# Patient Record
Sex: FEMALE | Race: BLACK OR AFRICAN AMERICAN
[De-identification: names, ages, dates, MRNs, and addresses within clinical notes are randomized per-mention and may not be internally consistent; named-entity substitution may affect disease eponyms.]

---

## 2018-04-25 ENCOUNTER — HOSPITAL ENCOUNTER (EMERGENCY)
Dept: HOSPITAL 17 - PHED | Age: 33
Discharge: HOME | End: 2018-04-25
Payer: MEDICAID

## 2018-04-25 VITALS — WEIGHT: 189.6 LBS | HEIGHT: 61 IN | BODY MASS INDEX: 35.8 KG/M2

## 2018-04-25 VITALS
SYSTOLIC BLOOD PRESSURE: 163 MMHG | OXYGEN SATURATION: 96 % | RESPIRATION RATE: 16 BRPM | TEMPERATURE: 99 F | DIASTOLIC BLOOD PRESSURE: 109 MMHG | HEART RATE: 70 BPM

## 2018-04-25 DIAGNOSIS — B00.1: Primary | ICD-10-CM

## 2018-04-25 PROCEDURE — 99283 EMERGENCY DEPT VISIT LOW MDM: CPT

## 2018-04-25 NOTE — PD
HPI


Chief Complaint:  ENT Complaint


Time Seen by Provider:  09:05


Travel History


International Travel<30 days:  No


Contact w/Intl Traveler<30days:  No


Traveled to known affect area:  No





History of Present Illness


HPI


This is a 32-year-old female here with painful blisters to her upper lip 3 

days.  Symptom severity is moderate.  She describes the pain as "burning" and 

constant.  No fever chills.  Similar symptoms in the past with cold sores.  

Denies any eye pain or visual changes.  No aggravating or alleviating factors.  

Has not attempted any OTC medications.





PFSH


Past Medical History


Medical History:  Denies Significant Hx


Blood Disorders:  No


Cancer:  No


Cardiovascular Problems:  No


Diabetes:  No


Diminished Hearing:  No


Endocrine:  No


Genitourinary:  No


Hepatitis:  No


Hiatal Hernia:  No


Immune Disorder:  No


Kidney Stones:  Yes


Medical other:  Yes


Musculoskeletal:  No


Neurologic:  No


Psychiatric:  No


Reproductive:  No


Respiratory:  No


Thyroid Disease:  No


Influenza Vaccination:  No


Pregnant?:  Not Pregnant


LMP:  2018


:  4


Para:  2


Miscarriage:  1


:  1


Tubal Ligation:  Yes





Past Surgical History


Abdominal Surgery:  No


AICD:  No


Cardiac Surgery:  No


Ear Surgery:  No


Endocrine Surgery:  No


Eye Surgery:  No


Genitourinary Surgery:  No


Gynecologic Surgery:  Yes (tubal LIGATION)


Joint Replacement:  No


Oral Surgery:  No


Pacemaker:  No


Thoracic Surgery:  No


Other Surgery:  Yes (TUBAL LIGATION)





Social History


Alcohol Use:  No


Tobacco Use:  No


Substance Use:  No





Allergies-Medications


(Allergen,Severity, Reaction):  


Coded Allergies:  


     promethazine (Unverified  Allergy, Severe, Itching, DYSTONIC REACTION, )


     *MDRO Multi-Drug Resistant Organism (Verified  Allergy, Unknown, 18)


 MRSA


Reported Meds & Prescriptions





Reported Meds & Active Scripts


Active


No Active Prescriptions or Reported Medications    








Review of Systems


Except as stated in HPI:  all other systems reviewed are Neg


General / Constitutional:  No: Fever


Eyes:  No: Visual changes


HENT:  No: Headaches


Cardiovascular:  No: Chest Pain or Discomfort


Respiratory:  No: Shortness of Breath


Gastrointestinal:  No: Abdominal Pain


Genitourinary:  No: Dysuria


Musculoskeletal:  No: Pain


Skin:  No Rash





Physical Exam


Narrative


GENERAL: Alert and well-appearing 32-year-old female


SKIN: Warm and dry.  Cluster vesicular lesions to the upper lip


HEAD: Normocephalic.


EYES: No injection or drainage. 


ENT; cluster of vesicular lesions to the left upper lip.  No pharyngeal 

erythema.  No oral ulcers.  Uvula is midline.  Airways patent.


NECK: Supple, trachea midline. No lymphadenopathy.


CARDIOVASCULAR: Regular rate and rhythm 


RESPIRATORY: Breath sounds equal bilaterally. No accessory muscle use.


GASTROINTESTINAL:  nondistended. 


MUSCULOSKELETAL: No cyanosis, or edema. 











Data


Data


Last Documented VS





Vital Signs








  Date Time  Temp Pulse Resp B/P (MAP) Pulse Ox O2 Delivery O2 Flow Rate FiO2


 


18 08:42 99.0 70 16 163/109 (127) 96   











MDM


Medical Decision Making


Medical Screen Exam Complete:  Yes


Emergency Medical Condition:  Yes


Differential Diagnosis


Herpes labialis, impetigo, other


Narrative Course


32-year-old female here with herpes labialis.  She will be treated with Valtrex.





Diagnosis





 Primary Impression:  


 Herpes labialis


Referrals:  


Primary Care Physician





***Additional Instructions:  


Medication as directed.


Follow-up with your primary doctor.


Return if new or worsening symptoms.


Scripts


Mupirocin Topical (Bactroban Topical) 22 Gm Cream


1 APPLIC TOPICAL TID for Mgmt Bacterial Infection, #1 TUBE 0 Refills


   Prov: Lisy Magallon         18 


Valacyclovir (Valtrex) 1,000 Mg Tab


2000 MG PO BID for Mgmt Viral Infection for 1 Day, #1 TAB 0 Refills


   Prov: Lisy Magallon         18


Disposition:  01 DISCHARGE HOME


Condition:  Stable











Lisy Magallon 2018 09:17

## 2018-05-06 ENCOUNTER — HOSPITAL ENCOUNTER (EMERGENCY)
Dept: HOSPITAL 17 - PHED | Age: 33
Discharge: HOME | End: 2018-05-06
Payer: MEDICAID

## 2018-05-06 VITALS
SYSTOLIC BLOOD PRESSURE: 194 MMHG | DIASTOLIC BLOOD PRESSURE: 105 MMHG | OXYGEN SATURATION: 100 % | HEART RATE: 85 BPM | RESPIRATION RATE: 20 BRPM

## 2018-05-06 VITALS
OXYGEN SATURATION: 95 % | SYSTOLIC BLOOD PRESSURE: 121 MMHG | RESPIRATION RATE: 16 BRPM | HEART RATE: 70 BPM | DIASTOLIC BLOOD PRESSURE: 74 MMHG

## 2018-05-06 VITALS — BODY MASS INDEX: 35.92 KG/M2 | HEIGHT: 61 IN | WEIGHT: 190.26 LBS

## 2018-05-06 VITALS — OXYGEN SATURATION: 100 % | HEART RATE: 65 BPM | RESPIRATION RATE: 24 BRPM | TEMPERATURE: 97.9 F

## 2018-05-06 VITALS — RESPIRATION RATE: 16 BRPM

## 2018-05-06 VITALS
RESPIRATION RATE: 18 BRPM | SYSTOLIC BLOOD PRESSURE: 166 MMHG | DIASTOLIC BLOOD PRESSURE: 93 MMHG | OXYGEN SATURATION: 100 % | HEART RATE: 68 BPM

## 2018-05-06 DIAGNOSIS — R19.7: Primary | ICD-10-CM

## 2018-05-06 DIAGNOSIS — Z87.442: ICD-10-CM

## 2018-05-06 DIAGNOSIS — E87.6: ICD-10-CM

## 2018-05-06 DIAGNOSIS — N20.0: ICD-10-CM

## 2018-05-06 LAB
ALBUMIN SERPL-MCNC: 3.8 GM/DL (ref 3.4–5)
ALP SERPL-CCNC: 127 U/L (ref 45–117)
ALT SERPL-CCNC: 51 U/L (ref 10–53)
AST SERPL-CCNC: 60 U/L (ref 15–37)
BACTERIA #/AREA URNS HPF: (no result) /HPF
BASOPHILS # BLD AUTO: 0.2 TH/MM3 (ref 0–0.2)
BASOPHILS NFR BLD: 1.5 % (ref 0–2)
BILIRUB SERPL-MCNC: 0.4 MG/DL (ref 0.2–1)
BUN SERPL-MCNC: 17 MG/DL (ref 7–18)
CALCIUM SERPL-MCNC: 8.7 MG/DL (ref 8.5–10.1)
CHLORIDE SERPL-SCNC: 102 MEQ/L (ref 98–107)
COLOR UR: YELLOW
CREAT SERPL-MCNC: 1.3 MG/DL (ref 0.5–1)
EOSINOPHIL # BLD: 0.1 TH/MM3 (ref 0–0.4)
EOSINOPHIL NFR BLD: 0.8 % (ref 0–4)
ERYTHROCYTE [DISTWIDTH] IN BLOOD BY AUTOMATED COUNT: 11.8 % (ref 11.6–17.2)
GFR SERPLBLD BASED ON 1.73 SQ M-ARVRAT: 58 ML/MIN (ref 89–?)
GLUCOSE SERPL-MCNC: 142 MG/DL (ref 74–106)
GLUCOSE UR STRIP-MCNC: 100 MG/DL
HCO3 BLD-SCNC: 26.2 MEQ/L (ref 21–32)
HCT VFR BLD CALC: 40.5 % (ref 35–46)
HGB BLD-MCNC: 13.5 GM/DL (ref 11.6–15.3)
HGB UR QL STRIP: (no result)
KETONES UR STRIP-MCNC: (no result) MG/DL
LEUKOCYTE ESTERASE UR QL STRIP: (no result) /HPF (ref 0–5)
LYMPHOCYTES # BLD AUTO: 6.1 TH/MM3 (ref 1–4.8)
LYMPHOCYTES NFR BLD AUTO: 41.2 % (ref 9–44)
LYMPHOCYTES: 50 % (ref 9–44)
MCH RBC QN AUTO: 28.9 PG (ref 27–34)
MCHC RBC AUTO-ENTMCNC: 33.4 % (ref 32–36)
MCV RBC AUTO: 86.7 FL (ref 80–100)
MONOCYTE #: 1 TH/MM3 (ref 0–0.9)
MONOCYTES NFR BLD: 6.6 % (ref 0–8)
MONOCYTES: 7 % (ref 0–8)
NEUTROPHILS # BLD AUTO: 7.4 TH/MM3 (ref 1.8–7.7)
NEUTROPHILS NFR BLD AUTO: 49.9 % (ref 16–70)
NEUTS BAND # BLD MANUAL: 6.4 TH/MM3 (ref 1.8–7.7)
NEUTS SEG NFR BLD MANUAL: 43 % (ref 16–70)
NITRITE UR QL STRIP: (no result)
PLATELET # BLD: 320 TH/MM3 (ref 150–450)
PMV BLD AUTO: 8.7 FL (ref 7–11)
PROT SERPL-MCNC: 7.7 GM/DL (ref 6.4–8.2)
RBC # BLD AUTO: 4.68 MIL/MM3 (ref 4–5.3)
RBC #/AREA URNS HPF: (no result) /HPF (ref 0–3)
SODIUM SERPL-SCNC: 136 MEQ/L (ref 136–145)
SP GR UR STRIP: 1.02 (ref 1–1.03)
SQUAMOUS #/AREA URNS HPF: (no result) /HPF (ref 0–5)
URINE LEUKOCYTE ESTERASE: (no result)
WBC # BLD AUTO: 14.8 TH/MM3 (ref 4–11)

## 2018-05-06 PROCEDURE — 99284 EMERGENCY DEPT VISIT MOD MDM: CPT

## 2018-05-06 PROCEDURE — 96374 THER/PROPH/DIAG INJ IV PUSH: CPT

## 2018-05-06 PROCEDURE — 84703 CHORIONIC GONADOTROPIN ASSAY: CPT

## 2018-05-06 PROCEDURE — 96361 HYDRATE IV INFUSION ADD-ON: CPT

## 2018-05-06 PROCEDURE — 74177 CT ABD & PELVIS W/CONTRAST: CPT

## 2018-05-06 PROCEDURE — 96372 THER/PROPH/DIAG INJ SC/IM: CPT

## 2018-05-06 PROCEDURE — 81001 URINALYSIS AUTO W/SCOPE: CPT

## 2018-05-06 PROCEDURE — 93005 ELECTROCARDIOGRAM TRACING: CPT

## 2018-05-06 PROCEDURE — 85027 COMPLETE CBC AUTOMATED: CPT

## 2018-05-06 PROCEDURE — 80053 COMPREHEN METABOLIC PANEL: CPT

## 2018-05-06 PROCEDURE — 85007 BL SMEAR W/DIFF WBC COUNT: CPT

## 2018-05-06 PROCEDURE — 96375 TX/PRO/DX INJ NEW DRUG ADDON: CPT

## 2018-05-06 PROCEDURE — 96376 TX/PRO/DX INJ SAME DRUG ADON: CPT

## 2018-05-06 PROCEDURE — 83690 ASSAY OF LIPASE: CPT

## 2018-05-06 NOTE — PD
HPI


Chief Complaint:  Abdominal Pain


Time Seen by Provider:  05:51


Travel History


International Travel<30 days:  No


Contact w/Intl Traveler<30days:  No


Traveled to known affect area:  No





History of Present Illness


HPI


The patient is a 32-year-old female that complains of abdominal pain since 11 

PM yesterday.  The pain is mostly on the right but it is bilateral upper 

quadrant pain as well.  The pain radiates to her back.  She claims a pain of 10/

10 for this sharp pain.  She denies any nausea or vomiting or fever.





PFSH


Past Medical History


Blood Disorders:  No


Cancer:  No


Cardiovascular Problems:  No


Diabetes:  No


Diminished Hearing:  No


Endocrine:  No


Genitourinary:  No


Hepatitis:  No


Hiatal Hernia:  No


Immune Disorder:  No


Kidney Stones:  Yes


Musculoskeletal:  No


Neurologic:  No


Psychiatric:  No


Reproductive:  No


Respiratory:  No


Thyroid Disease:  No


Pregnant?:  Not Pregnant


:  4


Para:  2


Miscarriage:  1


:  1


Tubal Ligation:  Yes





Past Surgical History


Abdominal Surgery:  No


AICD:  No


Cardiac Surgery:  No


Ear Surgery:  No


Endocrine Surgery:  No


Eye Surgery:  No


Genitourinary Surgery:  No


Gynecologic Surgery:  Yes (tubal LIGATION)


Joint Replacement:  No


Oral Surgery:  No


Pacemaker:  No


Thoracic Surgery:  No


Other Surgery:  Yes (TUBAL LIGATION)





Social History


Alcohol Use:  Yes (occ)


Tobacco Use:  No


Substance Use:  No





Allergies-Medications


(Allergen,Severity, Reaction):  


Coded Allergies:  


     promethazine (Unverified  Allergy, Severe, Itching, DYSTONIC REACTION, )


     *MDRO Multi-Drug Resistant Organism (Verified  Allergy, Unknown, 18)


 MRSA


Reported Meds & Prescriptions





Reported Meds & Active Scripts


Active


No Active Prescriptions or Reported Medications    








Review of Systems


Except as stated in HPI:  all other systems reviewed are Neg





Physical Exam


Narrative


GENERAL: The patient is alert, oriented 3 and moderate to severe distress with 

her abdominal pain.  Her vital signs show respiratory rate of 24 but otherwise 

are normal.


SKIN: Focused skin assessment warm/dry.  No skin rash is seen.


HEAD: Atraumatic. Normocephalic. 


EYES: Pupils equal and round. No scleral icterus. No injection or drainage. 


ENT: No nasal bleeding or discharge.  Mucous membranes pink and moist.


NECK: Trachea midline. No JVD. 


CARDIOVASCULAR: Regular rate and rhythm.  No murmur appreciated.


RESPIRATORY: No accessory muscle use. Clear to auscultation. Breath sounds 

equal bilaterally. 


GASTROINTESTINAL: Abdomen soft, with tenderness to direct palpation in the 

bilateral upper quadrants, nondistended. Hepatic and splenic margins not 

palpable.  No guarding or rebound is present.  Gordon sign is questionably 

positive.


MUSCULOSKELETAL: No obvious deformities. No clubbing.  No cyanosis.  No edema. 


NEUROLOGICAL: Awake and alert. No obvious cranial nerve deficits.  Motor 

grossly within normal limits. Normal speech.


PSYCHIATRIC: Appropriate mood and affect; insight and judgment normal.





Data


Data


Last Documented VS





Vital Signs








  Date Time  Temp Pulse Resp B/P (MAP) Pulse Ox O2 Delivery O2 Flow Rate FiO2


 


18 06:00  85 20 194/105 (134) 100   


 


18 05:36 97.9       








Orders





 Orders


Complete Blood Count With Diff (18 05:52)


Comprehensive Metabolic Panel (18 05:52)


Lipase (18 05:52)


Urinalysis - C+S If Indicated (18 05:52)


Ct Abd/Pel W Iv Contrast(Rout) (18 05:52)


Iv Access Insert/Monitor (18 05:52)


Ecg Monitoring (18 05:52)


Oximetry (18 05:52)


Morphine Inj (Morphine Inj) (18 06:00)


Ondansetron Inj (Zofran Inj) (18 06:00)


Sodium Chlor 0.9% 1000 Ml Inj (Ns 1000 M (18 05:52)


Sodium Chloride 0.9% Flush (Ns Flush) (18 06:00)


Electrocardiogram (18 05:52)


Ed Urine Pregnancytest Poc (18 06:22)


Morphine Inj (Morphine Inj) (18 06:45)


Ondansetron Inj (Zofran Inj) (18 06:45)


Iohexol 350 Inj (Omnipaque 350 Inj) (18 07:06)





Labs





Laboratory Tests








Test


  18


05:45 5


06:20


 


White Blood Count 14.8 TH/MM3  


 


Red Blood Count 4.68 MIL/MM3  


 


Hemoglobin 13.5 GM/DL  


 


Hematocrit 40.5 %  


 


Mean Corpuscular Volume 86.7 FL  


 


Mean Corpuscular Hemoglobin 28.9 PG  


 


Mean Corpuscular Hemoglobin


Concent 33.4 % 


  


 


 


Red Cell Distribution Width 11.8 %  


 


Platelet Count 320 TH/MM3  


 


Mean Platelet Volume 8.7 FL  


 


Neutrophils (%) (Auto) 49.9 %  


 


Lymphocytes (%) (Auto) 41.2 %  


 


Monocytes (%) (Auto) 6.6 %  


 


Eosinophils (%) (Auto) 0.8 %  


 


Basophils (%) (Auto) 1.5 %  


 


Neutrophils # (Auto) 7.4 TH/MM3  


 


Lymphocytes # (Auto) 6.1 TH/MM3  


 


Monocytes # (Auto) 1.0 TH/MM3  


 


Eosinophils # (Auto) 0.1 TH/MM3  


 


Basophils # (Auto) 0.2 TH/MM3  


 


CBC Comment AUTO DIFF  


 


Differential Total Cells


Counted 100 


  


 


 


Neutrophils % (Manual) 43 %  


 


Lymphocytes % 50 %  


 


Monocytes % 7 %  


 


Neutrophils # (Manual) 6.4 TH/MM3  


 


Differential Comment


  FINAL DIFF


MANUAL 


 


 


Platelet Estimate NORMAL  


 


Platelet Morphology Comment NORMAL  


 


Red Cell Morphology Comment NORMAL  


 


Blood Urea Nitrogen 17 MG/DL  


 


Creatinine 1.30 MG/DL  


 


Random Glucose 142 MG/DL  


 


Total Protein 7.7 GM/DL  


 


Albumin 3.8 GM/DL  


 


Calcium Level 8.7 MG/DL  


 


Alkaline Phosphatase 127 U/L  


 


Aspartate Amino Transf


(AST/SGOT) 60 U/L 


  


 


 


Alanine Aminotransferase


(ALT/SGPT) 51 U/L 


  


 


 


Total Bilirubin 0.4 MG/DL  


 


Sodium Level 136 MEQ/L  


 


Potassium Level 3.0 MEQ/L  


 


Chloride Level 102 MEQ/L  


 


Carbon Dioxide Level 26.2 MEQ/L  


 


Anion Gap 8 MEQ/L  


 


Estimat Glomerular Filtration


Rate 58 ML/MIN 


  


 


 


Lipase 144 U/L  


 


Urine Color  YELLOW 


 


Urine Turbidity  SL CLOUDY 


 


Urine pH  7.0 


 


Urine Specific Gravity  1.020 


 


Urine Protein  NEG mg/dL 


 


Urine Glucose (UA)  100 mg/dL 


 


Urine Ketones  NEG mg/dL 


 


Urine Occult Blood  TRACE 


 


Urine Nitrite  NEG 


 


Urine Bilirubin  NEG 


 


Urine Urobilinogen  0.2 MG/DL 


 


Urine Leukocyte Esterase  NEG 


 


Urine RBC  3-5 /hpf 


 


Urine WBC  0-2 /hpf 


 


Urine Squamous Epithelial


Cells 


  6-8 /hpf 


 


 


Urine Bacteria  NONE /hpf 


 


Microscopic Urinalysis Comment


  


  CULT NOT


INDICATED











MDM


Medical Decision Making


Medical Screen Exam Complete:  Yes


Emergency Medical Condition:  Yes


Medical Record Reviewed:  Yes


Interpretation(s)


The EKG shows sinus rhythm with a rate of 76 and no acute ST elevation or 

depression.


Differential Diagnosis


Cholecystitis, colitis, pyelonephritis, abdominal pain etiology undetermined


Narrative Course


It is now 0719 and the patient is transferred to Dr. Butler.


Scripts


No Active Prescriptions or Reported Meds











Bryan Membreno MD May 6, 2018 06:00

## 2018-05-06 NOTE — EKG
Date Performed: 05/06/2018       Time Performed: 05:51:14

 

PTAGE:      32 years

 

EKG:      Sinus rhythm 

 

 NORMAL ECG 

 

NO PREVIOUS TRACING            

 

DOCTOR:   Andrew Ferrera  Interpretating Date/Time  05/06/2018 12:07:01

## 2018-05-06 NOTE — RADRPT
EXAM DATE/TIME:  05/06/2018 06:57 

 

[facility] COMPARISON:     

No previous studies available for comparison.

 

 

INDICATIONS :     

Epigastric and right lower quadrant pain. 

                      

 

IV CONTRAST:     

80 cc Omnipaque 350 (iohexol) IV 

 

 

ORAL CONTRAST:      

No oral contrast ingested.

                      

 

RADIATION DOSE:     

15.55 CTDIvol (mGy) 

 

 

MEDICAL HISTORY :     

Renal calculi.  

 

SURGICAL HISTORY :      

Tubal ligation. 

 

ENCOUNTER:      

Initial

 

ACUITY:      

1 day

 

PAIN SCALE:      

10/10

 

LOCATION:       

Right lower quadrant 

 

TECHNIQUE:     

Volumetric scanning of the abdomen and pelvis was performed.  Using automated exposure control and ad
justment of the mA and/or kV according to patient size, radiation dose was kept as low as reasonably 
achievable to obtain optimal diagnostic quality images.  DICOM format image data is available electro
nically for review and comparison.  

 

FINDINGS:     

 

LOWER LUNGS:     

The visualized lower lungs are clear.

 

LIVER:     

Homogeneous density without lesion.  There is no dilation of the biliary tree.  No calcified gallston
es.

 

SPLEEN:     

Normal size without lesion.

 

PANCREAS:     

Within normal limits.

 

KIDNEYS:     

Normal in size and shape.  There is no mass or hydronephrosis. A 3 mm nonobstructing stone is present
 in the left lower pole collecting system.

 

ADRENAL GLANDS:     

Within normal limits.

 

VASCULAR:     

There is no aortic aneurysm.

 

BOWEL/MESENTERY:     

The stomach, small bowel, and colon demonstrate no acute abnormality.  There is no free intraperitone
al air. Appendix is normal. There is trace free fluid in the pelvis.

 

ABDOMINAL WALL:     

Within normal limits.

 

RETROPERITONEUM:     

There is no lymphadenopathy. 

 

BLADDER:     

No wall thickening or mass. 

 

REPRODUCTIVE:     

Within normal limits.

 

INGUINAL:     

There is no lymphadenopathy or hernia. 

 

MUSCULOSKELETAL:     

There is an area of sclerosis in the left superior pubic ramus.

 

CONCLUSION:     

1. No acute abnormalities identified to explain the clinical symptoms. The appendix has a normal appe
arance. Trace free fluid is present in the pelvis which is a nonspecific finding but could be physiol
ogic.

2. There is a 3 mm nonobstructing left renal stone.

 

 

 

 Constantine Talamantes MD on May 06, 2018 at 7:09           

Board Certified Radiologist.

 This report was verified electronically.

## 2018-05-06 NOTE — PD
Data


Data


Last Documented VS





Vital Signs








  Date Time  Temp Pulse Resp B/P (MAP) Pulse Ox O2 Delivery O2 Flow Rate FiO2


 


5/6/18 07:47  68 18 166/93 (117) 100 Room Air  


 


5/6/18 05:36 97.9       








Orders





 Orders


Complete Blood Count With Diff (5/6/18 05:52)


Comprehensive Metabolic Panel (5/6/18 05:52)


Lipase (5/6/18 05:52)


Urinalysis - C+S If Indicated (5/6/18 05:52)


Ct Abd/Pel W Iv Contrast(Rout) (5/6/18 05:52)


Iv Access Insert/Monitor (5/6/18 05:52)


Ecg Monitoring (5/6/18 05:52)


Oximetry (5/6/18 05:52)


Morphine Inj (Morphine Inj) (5/6/18 06:00)


Ondansetron Inj (Zofran Inj) (5/6/18 06:00)


Sodium Chlor 0.9% 1000 Ml Inj (Ns 1000 M (5/6/18 05:52)


Sodium Chloride 0.9% Flush (Ns Flush) (5/6/18 06:00)


Electrocardiogram (5/6/18 05:52)


Ed Urine Pregnancytest Poc (5/6/18 06:22)


Morphine Inj (Morphine Inj) (5/6/18 06:45)


Ondansetron Inj (Zofran Inj) (5/6/18 06:45)


Iohexol 350 Inj (Omnipaque 350 Inj) (5/6/18 07:06)


Morphine Inj (Morphine Inj) (5/6/18 07:45)


Ondansetron Inj (Zofran Inj) (5/6/18 07:45)


Sodium Chlor 0.9% 1000 Ml Inj (Ns 1000 M (5/6/18 07:41)


Dicyclomine Inj (Bentyl Inj) (5/6/18 07:45)


Ketorolac Inj (Toradol Inj) (5/6/18 07:45)


Al-Mag Hy-Si 40-40-4 Mg/Ml Liq (Mag-Al P (5/6/18 07:45)


Lidocaine 2% Viscous (Xylocaine 2% Visco (5/6/18 07:45)





Labs





Laboratory Tests








Test


  5/6/18


05:45 5/6/18


06:20


 


White Blood Count 14.8 TH/MM3  


 


Red Blood Count 4.68 MIL/MM3  


 


Hemoglobin 13.5 GM/DL  


 


Hematocrit 40.5 %  


 


Mean Corpuscular Volume 86.7 FL  


 


Mean Corpuscular Hemoglobin 28.9 PG  


 


Mean Corpuscular Hemoglobin


Concent 33.4 % 


  


 


 


Red Cell Distribution Width 11.8 %  


 


Platelet Count 320 TH/MM3  


 


Mean Platelet Volume 8.7 FL  


 


Neutrophils (%) (Auto) 49.9 %  


 


Lymphocytes (%) (Auto) 41.2 %  


 


Monocytes (%) (Auto) 6.6 %  


 


Eosinophils (%) (Auto) 0.8 %  


 


Basophils (%) (Auto) 1.5 %  


 


Neutrophils # (Auto) 7.4 TH/MM3  


 


Lymphocytes # (Auto) 6.1 TH/MM3  


 


Monocytes # (Auto) 1.0 TH/MM3  


 


Eosinophils # (Auto) 0.1 TH/MM3  


 


Basophils # (Auto) 0.2 TH/MM3  


 


CBC Comment AUTO DIFF  


 


Differential Total Cells


Counted 100 


  


 


 


Neutrophils % (Manual) 43 %  


 


Lymphocytes % 50 %  


 


Monocytes % 7 %  


 


Neutrophils # (Manual) 6.4 TH/MM3  


 


Differential Comment


  FINAL DIFF


MANUAL 


 


 


Platelet Estimate NORMAL  


 


Platelet Morphology Comment NORMAL  


 


Red Cell Morphology Comment NORMAL  


 


Blood Urea Nitrogen 17 MG/DL  


 


Creatinine 1.30 MG/DL  


 


Random Glucose 142 MG/DL  


 


Total Protein 7.7 GM/DL  


 


Albumin 3.8 GM/DL  


 


Calcium Level 8.7 MG/DL  


 


Alkaline Phosphatase 127 U/L  


 


Aspartate Amino Transf


(AST/SGOT) 60 U/L 


  


 


 


Alanine Aminotransferase


(ALT/SGPT) 51 U/L 


  


 


 


Total Bilirubin 0.4 MG/DL  


 


Sodium Level 136 MEQ/L  


 


Potassium Level 3.0 MEQ/L  


 


Chloride Level 102 MEQ/L  


 


Carbon Dioxide Level 26.2 MEQ/L  


 


Anion Gap 8 MEQ/L  


 


Estimat Glomerular Filtration


Rate 58 ML/MIN 


  


 


 


Lipase 144 U/L  


 


Urine Color  YELLOW 


 


Urine Turbidity  SL CLOUDY 


 


Urine pH  7.0 


 


Urine Specific Gravity  1.020 


 


Urine Protein  NEG mg/dL 


 


Urine Glucose (UA)  100 mg/dL 


 


Urine Ketones  NEG mg/dL 


 


Urine Occult Blood  TRACE 


 


Urine Nitrite  NEG 


 


Urine Bilirubin  NEG 


 


Urine Urobilinogen  0.2 MG/DL 


 


Urine Leukocyte Esterase  NEG 


 


Urine RBC  3-5 /hpf 


 


Urine WBC  0-2 /hpf 


 


Urine Squamous Epithelial


Cells 


  6-8 /hpf 


 


 


Urine Bacteria  NONE /hpf 


 


Microscopic Urinalysis Comment


  


  CULT NOT


INDICATED











[facility]


Medical Record Reviewed:  Yes


Supervised Visit with RAHUL:  No


Narrative Course


CBC & BMP Diagram


5/6/18 05:45








Total Protein 7.7, Albumin 3.8, Calcium Level 8.7, Alkaline Phosphatase 127 H, 

Aspartate Amino Transf (AST/SGOT) 60 H, Alanine Aminotransferase (ALT/SGPT) 51, 

Total Bilirubin 0.4





Urinalysis shows no UTI





Last Impressions








Abdomen/Pelvis CT 5/6/18 0552 Signed





Impressions: 





 Service Date/Time:  Vic, May 6, 2018 06:57 - CONCLUSION:  1. No acute 





 abnormalities identified to explain the clinical symptoms. The appendix has a 





 normal appearance. Trace free fluid is present in the pelvis which is a 





 nonspecific finding but could be physiologic. 2. There is a 3 mm 

nonobstructing 





 left renal stone.     Constantine Talamantes MD 





Patient reports marginal improvement at about 7:45 AM.  She notes eating a 

sandwich from [employer] yesterday and believes maybe it was related to a subsequent 

bout of diarrhea lasting a few hours.  She's had no fever.  No vomiting.  No 

vaginal bleeding.  In this scenario I think presentation is most in keeping 

with a gastrointestinal etiology ultrasound to rule out torsion would be low 

yield.  We'll focus on symptom control which is in keeping the patient's 

expectations.


Diagnosis





 Primary Impression:  


 Diarrhea


 Qualified Codes:  R19.7 - Diarrhea, unspecified


 Additional Impression:  


 Hypokalemia


Referrals:  


Primary Care Physician


call for appointment


***Med/Other Pt SpecificInfo:  Prescription(s) given


Scripts


Dicyclomine (Bentyl) 10 Mg Cap


10 MG PO TID Y for Bowel Management, #20 CAP 0 Refills


   Prov: Levon Butler MD         5/6/18


Disposition:  01 DISCHARGE HOME


Condition:  Stable











Levon Butler MD May 6, 2018 07:56